# Patient Record
Sex: FEMALE | Race: WHITE | Employment: OTHER | URBAN - METROPOLITAN AREA
[De-identification: names, ages, dates, MRNs, and addresses within clinical notes are randomized per-mention and may not be internally consistent; named-entity substitution may affect disease eponyms.]

---

## 2024-03-20 ENCOUNTER — OFFICE VISIT (OUTPATIENT)
Dept: PRIMARY CARE | Facility: CLINIC | Age: 74
End: 2024-03-20
Payer: MEDICARE

## 2024-03-20 VITALS
HEIGHT: 58 IN | TEMPERATURE: 97 F | OXYGEN SATURATION: 97 % | DIASTOLIC BLOOD PRESSURE: 75 MMHG | BODY MASS INDEX: 25.13 KG/M2 | RESPIRATION RATE: 16 BRPM | SYSTOLIC BLOOD PRESSURE: 140 MMHG | WEIGHT: 119.71 LBS | HEART RATE: 76 BPM

## 2024-03-20 DIAGNOSIS — M81.0 AGE-RELATED OSTEOPOROSIS WITHOUT CURRENT PATHOLOGICAL FRACTURE: ICD-10-CM

## 2024-03-20 DIAGNOSIS — R79.89 HIGH SERUM LOW-DENSITY LIPOPROTEIN (LDL): ICD-10-CM

## 2024-03-20 DIAGNOSIS — Z12.31 OTHER SCREENING MAMMOGRAM: ICD-10-CM

## 2024-03-20 DIAGNOSIS — R03.0 WHITE COAT SYNDROME WITHOUT DIAGNOSIS OF HYPERTENSION: ICD-10-CM

## 2024-03-20 DIAGNOSIS — R73.09 ABNORMAL GLUCOSE: ICD-10-CM

## 2024-03-20 DIAGNOSIS — K13.70 LESION OF ORAL MUCOSA: Primary | ICD-10-CM

## 2024-03-20 PROBLEM — J30.9 ALLERGIC RHINITIS: Status: ACTIVE | Noted: 2024-03-20

## 2024-03-20 PROBLEM — M19.90 INFLAMMATORY OSTEOARTHRITIS: Status: ACTIVE | Noted: 2021-09-30

## 2024-03-20 PROBLEM — B02.9 HERPES ZOSTER: Status: RESOLVED | Noted: 2024-03-20 | Resolved: 2024-03-20

## 2024-03-20 PROBLEM — K56.609 SMALL BOWEL OBSTRUCTION (MULTI): Status: RESOLVED | Noted: 2018-10-23 | Resolved: 2024-03-20

## 2024-03-20 PROBLEM — B02.9 HERPES ZOSTER: Status: ACTIVE | Noted: 2024-03-20

## 2024-03-20 PROBLEM — S82.899A ANKLE FRACTURE: Status: RESOLVED | Noted: 2024-03-20 | Resolved: 2024-03-20

## 2024-03-20 PROBLEM — M25.541 JOINT PAIN IN FINGERS OF RIGHT HAND: Status: RESOLVED | Noted: 2021-09-30 | Resolved: 2024-03-20

## 2024-03-20 PROBLEM — K58.1 IRRITABLE BOWEL SYNDROME WITH CONSTIPATION: Status: ACTIVE | Noted: 2021-09-30

## 2024-03-20 PROBLEM — M25.50 PAIN IN JOINT, MULTIPLE SITES: Status: RESOLVED | Noted: 2021-09-30 | Resolved: 2024-03-20

## 2024-03-20 PROBLEM — R19.4 CHANGE IN BOWEL HABITS: Status: RESOLVED | Noted: 2018-11-28 | Resolved: 2024-03-20

## 2024-03-20 PROBLEM — E04.2 NONTOXIC MULTINODULAR GOITER: Status: ACTIVE | Noted: 2019-05-16

## 2024-03-20 PROBLEM — M41.24 OTHER IDIOPATHIC SCOLIOSIS, THORACIC REGION: Status: ACTIVE | Noted: 2021-09-30

## 2024-03-20 PROCEDURE — 3077F SYST BP >= 140 MM HG: CPT | Performed by: INTERNAL MEDICINE

## 2024-03-20 PROCEDURE — 3078F DIAST BP <80 MM HG: CPT | Performed by: INTERNAL MEDICINE

## 2024-03-20 PROCEDURE — 99204 OFFICE O/P NEW MOD 45 MIN: CPT | Performed by: INTERNAL MEDICINE

## 2024-03-20 PROCEDURE — 1159F MED LIST DOCD IN RCRD: CPT | Performed by: INTERNAL MEDICINE

## 2024-03-20 PROCEDURE — 1160F RVW MEDS BY RX/DR IN RCRD: CPT | Performed by: INTERNAL MEDICINE

## 2024-03-20 PROCEDURE — 1036F TOBACCO NON-USER: CPT | Performed by: INTERNAL MEDICINE

## 2024-03-20 RX ORDER — DICLOFENAC SODIUM 10 MG/G
2 GEL TOPICAL EVERY 12 HOURS PRN
COMMUNITY
Start: 2021-07-28

## 2024-03-20 RX ORDER — CICLOPIROX 80 MG/ML
SOLUTION TOPICAL
COMMUNITY
Start: 2023-12-22

## 2024-03-20 RX ORDER — ALENDRONATE SODIUM 70 MG/1
70 TABLET ORAL
COMMUNITY
Start: 2024-02-19

## 2024-03-20 ASSESSMENT — ENCOUNTER SYMPTOMS
DEPRESSION: 1
LOSS OF SENSATION IN FEET: 1
ARTHRALGIAS: 1
OCCASIONAL FEELINGS OF UNSTEADINESS: 0

## 2024-03-20 NOTE — ASSESSMENT & PLAN NOTE
Continue alendronate calcium and vitamin D3 supplements.  Exercise daily.  Last lumbar spine T-score -3.5.  Follow-up with rheumatologist

## 2024-03-20 NOTE — PROGRESS NOTES
"Subjective   Patient ID: Rachel Mancilla is a 73 y.o. female who presents for New Patient Visit and Mouth Injury.    New patient comes to establish with new primary care physician.  She follows up with rheumatologist at [facility] for osteoporosis.  Takes alendronate.  Complains of finger pain due to osteoarthritis is worse on the right hand.  Has had a sore in her mouth for 1 week, feels better today.      Mouth Injury       Review of Systems   HENT:          Mouth sore.   Musculoskeletal:  Positive for arthralgias.   All other systems reviewed and are negative.      Objective   /86 (BP Location: Left arm, Patient Position: Sitting)   Pulse 76   Temp 36.1 °C (97 °F) (Temporal)   Resp 16   Ht 1.482 m (4' 10.35\")   Wt 54.3 kg (119 lb 11.4 oz)   SpO2 97%   BMI 24.72 kg/m²     Physical Exam  Constitutional:       Appearance: Normal appearance.   HENT:      Head: Normocephalic and atraumatic.      Comments: Small 1mm whitish lesion rt. Lower  inner lip.     Right Ear: External ear normal.      Left Ear: External ear normal.      Mouth/Throat:      Mouth: Mucous membranes are moist.      Pharynx: Oropharynx is clear.   Neck:      Vascular: No carotid bruit.   Cardiovascular:      Rate and Rhythm: Normal rate and regular rhythm.      Heart sounds: No murmur heard.     No gallop.   Pulmonary:      Effort: Pulmonary effort is normal. No respiratory distress.      Breath sounds: No wheezing or rales.   Abdominal:      General: Abdomen is flat.      Palpations: Abdomen is soft.      Hernia: No hernia is present.   Musculoskeletal:         General: No swelling, tenderness, deformity or signs of injury.      Cervical back: No rigidity or tenderness.      Right lower leg: No edema.   Lymphadenopathy:      Cervical: No cervical adenopathy.   Skin:     Coloration: Skin is not jaundiced or pale.      Findings: No bruising, erythema, lesion or rash.      Comments: Rt. Foot warts.   Neurological:      General: No " focal deficit present.      Mental Status: She is oriented to person, place, and time.      Motor: No weakness.      Coordination: Coordination normal.   Psychiatric:         Mood and Affect: Mood normal.         Behavior: Behavior normal.       Assessment/Plan   Problem List Items Addressed This Visit             ICD-10-CM    Osteoporosis M81.0     Continue alendronate calcium and vitamin D3 supplements.  Exercise daily.  Last lumbar spine T-score -3.5.  Follow-up with rheumatologist         Lesion of oral mucosa - Primary K13.70     Very small, benign looking no ulcer.  No treatment recommendations, should heal on its own         Relevant Orders    CBC and Auto Differential    White coat syndrome without diagnosis of hypertension R03.0    Relevant Orders    Comprehensive Metabolic Panel     Other Visit Diagnoses         Codes    Other screening mammogram     Z12.31    Relevant Orders    BI mammo bilateral screening tomosynthesis    High serum low-density lipoprotein (LDL)     R79.89    Relevant Orders    Lipid Panel    Abnormal glucose     R73.09    Relevant Orders    Hemoglobin A1C

## 2024-06-26 ENCOUNTER — APPOINTMENT (OUTPATIENT)
Dept: PRIMARY CARE | Facility: CLINIC | Age: 74
End: 2024-06-26
Payer: MEDICARE